# Patient Record
Sex: FEMALE | URBAN - METROPOLITAN AREA
[De-identification: names, ages, dates, MRNs, and addresses within clinical notes are randomized per-mention and may not be internally consistent; named-entity substitution may affect disease eponyms.]

---

## 2021-11-11 ENCOUNTER — OFFICE VISIT (OUTPATIENT)
Dept: URBAN - METROPOLITAN AREA CLINIC 35 | Facility: CLINIC | Age: 46
End: 2021-11-11

## 2021-11-11 NOTE — HPI-MIGRATED HPI
;     Gastroesophageal Reflux :                                    46 year old female patient presents for heartburn consult. Patient states he has been experiencing symptoms since ___. Patient admits /denies currently taking any OTC or prescribed medications for relief of symptoms. Patient describes symptoms as__and states they are most present during or after ___. Patient admits /denies nighttime symptoms. Patient admits /denies nausea or vomiting. Patient admits /denies any associated weight loss. Patient admits/denies EGD in the past.;

## 2022-03-15 ENCOUNTER — LAB OUTSIDE AN ENCOUNTER (OUTPATIENT)
Dept: URBAN - METROPOLITAN AREA CLINIC 35 | Facility: CLINIC | Age: 47
End: 2022-03-15

## 2022-03-15 ENCOUNTER — OFFICE VISIT (OUTPATIENT)
Dept: URBAN - METROPOLITAN AREA CLINIC 35 | Facility: CLINIC | Age: 47
End: 2022-03-15
Payer: COMMERCIAL

## 2022-03-15 VITALS
HEART RATE: 83 BPM | OXYGEN SATURATION: 99 % | BODY MASS INDEX: 28.99 KG/M2 | HEIGHT: 65 IN | SYSTOLIC BLOOD PRESSURE: 142 MMHG | DIASTOLIC BLOOD PRESSURE: 108 MMHG | WEIGHT: 174 LBS

## 2022-03-15 DIAGNOSIS — R14.0 BLOATING SYMPTOM: ICD-10-CM

## 2022-03-15 DIAGNOSIS — K21.9 GERD WITHOUT ESOPHAGITIS: ICD-10-CM

## 2022-03-15 PROCEDURE — 99204 OFFICE O/P NEW MOD 45 MIN: CPT | Performed by: INTERNAL MEDICINE

## 2022-03-15 RX ORDER — CHOLECALCIFEROL (VITAMIN D3) 50 MCG
1 TABLET TABLET ORAL ONCE A DAY
Status: ACTIVE | COMMUNITY

## 2022-03-15 RX ORDER — SODIUM PICOSULFATE, MAGNESIUM OXIDE, AND ANHYDROUS CITRIC ACID 10; 3.5; 12 MG/160ML; G/160ML; G/160ML
160 ML LIQUID ORAL
Qty: 320 MILLILITER | Refills: 0 | OUTPATIENT
Start: 2022-05-25 | End: 2022-05-27

## 2022-03-15 RX ORDER — OMEPRAZOLE 40 MG/1
1 CAPSULE 30 MINUTES BEFORE MORNING MEAL CAPSULE, DELAYED RELEASE ORAL ONCE A DAY
Status: ACTIVE | COMMUNITY

## 2022-03-15 NOTE — HPI-MIGRATED HPI
;     Gastroesophageal Reflux :                                     47 year old female patient presents for heartburn and SOB  consult. Patient states she has been experiencing symptoms for approximately 1 year. Patient admits  currently taking Omeprazole 40mg prescribed by PCP for relief of symptoms.  She does admit since taking Omeprazole her SOB has improved. Patient describes symptoms as severe SOB , Dysphagia and constanty cearing her throat . Pt states they are most present during the evening or after meals .  Patient admits  nighttime symptoms waking her during her sleep which she states she has woke up gasping for Air Several times. Patient denies nausea or vomiting. Patient denies any associated weight loss. Patient denies EGD in the past.;   Of note patient has visited [facility] ER  [place] approximately 2 weeks ago due to chest pains and SOB . She was diagnosed with anxiety . She had fu Cardiac workup including EKG, Echo ,MRI and several other test which all resulted normal .

## 2022-05-24 ENCOUNTER — TELEPHONE ENCOUNTER (OUTPATIENT)
Dept: URBAN - METROPOLITAN AREA CLINIC 23 | Facility: CLINIC | Age: 47
End: 2022-05-24

## 2022-05-25 ENCOUNTER — LAB OUTSIDE AN ENCOUNTER (OUTPATIENT)
Dept: URBAN - METROPOLITAN AREA CLINIC 35 | Facility: CLINIC | Age: 47
End: 2022-05-25

## 2022-05-26 ENCOUNTER — OFFICE VISIT (OUTPATIENT)
Dept: URBAN - METROPOLITAN AREA SURGERY CENTER 8 | Facility: SURGERY CENTER | Age: 47
End: 2022-05-26
Payer: COMMERCIAL

## 2022-05-26 DIAGNOSIS — Z12.11 COLON CANCER SCREENING: ICD-10-CM

## 2022-05-26 DIAGNOSIS — K31.89 ACQUIRED DEFORMITY OF DUODENUM: ICD-10-CM

## 2022-05-26 DIAGNOSIS — R14.0 ABDOMINAL BLOATING: ICD-10-CM

## 2022-05-26 DIAGNOSIS — R12 BURNING REFLUX: ICD-10-CM

## 2022-05-26 DIAGNOSIS — K21.9 ACID REFLUX: ICD-10-CM

## 2022-05-26 DIAGNOSIS — K63.5 BENIGN COLON POLYP: ICD-10-CM

## 2022-05-26 PROCEDURE — G8907 PT DOC NO EVENTS ON DISCHARG: HCPCS | Performed by: INTERNAL MEDICINE

## 2022-05-26 PROCEDURE — 43239 EGD BIOPSY SINGLE/MULTIPLE: CPT | Performed by: INTERNAL MEDICINE

## 2022-05-26 PROCEDURE — 45380 COLONOSCOPY AND BIOPSY: CPT | Performed by: INTERNAL MEDICINE

## 2022-05-26 RX ORDER — OMEPRAZOLE 40 MG/1
1 CAPSULE 30 MINUTES BEFORE MORNING MEAL CAPSULE, DELAYED RELEASE ORAL ONCE A DAY
Status: ACTIVE | COMMUNITY

## 2022-05-26 RX ORDER — CHOLECALCIFEROL (VITAMIN D3) 50 MCG
1 TABLET TABLET ORAL ONCE A DAY
Status: ACTIVE | COMMUNITY

## 2022-05-26 RX ORDER — SODIUM PICOSULFATE, MAGNESIUM OXIDE, AND ANHYDROUS CITRIC ACID 10; 3.5; 12 MG/160ML; G/160ML; G/160ML
160 ML LIQUID ORAL
Qty: 320 MILLILITER | Refills: 0 | Status: ACTIVE | COMMUNITY
Start: 2022-05-25 | End: 2022-05-27

## 2022-06-14 ENCOUNTER — CLAIMS CREATED FROM THE CLAIM WINDOW (OUTPATIENT)
Dept: URBAN - METROPOLITAN AREA CLINIC 35 | Facility: CLINIC | Age: 47
End: 2022-06-14
Payer: COMMERCIAL

## 2022-06-14 VITALS — WEIGHT: 173 LBS | BODY MASS INDEX: 28.82 KG/M2 | HEART RATE: 99 BPM | OXYGEN SATURATION: 99 % | HEIGHT: 65 IN

## 2022-06-14 DIAGNOSIS — K21.9 GERD WITHOUT ESOPHAGITIS: ICD-10-CM

## 2022-06-14 DIAGNOSIS — K58.1 IRRITABLE BOWEL SYNDROME WITH CONSTIPATION: ICD-10-CM

## 2022-06-14 DIAGNOSIS — Z12.11 ENCOUNTER FOR SCREENING FOR MALIGNANT NEOPLASM OF COLON: ICD-10-CM

## 2022-06-14 DIAGNOSIS — R14.0 BLOATING SYMPTOM: ICD-10-CM

## 2022-06-14 PROBLEM — 305058001: Status: ACTIVE | Noted: 2022-05-25

## 2022-06-14 PROBLEM — 440630006: Status: ACTIVE | Noted: 2022-06-14

## 2022-06-14 PROBLEM — 266435005: Status: ACTIVE | Noted: 2022-03-15

## 2022-06-14 PROBLEM — 248490000: Status: ACTIVE | Noted: 2022-03-15

## 2022-06-14 PROCEDURE — 99214 OFFICE O/P EST MOD 30 MIN: CPT | Performed by: INTERNAL MEDICINE

## 2022-06-14 RX ORDER — FAMOTIDINE 20 MG/1
1 TABLET AT BEDTIME AS NEEDED TABLET, FILM COATED ORAL ONCE A DAY
Status: ACTIVE | COMMUNITY

## 2022-06-14 RX ORDER — CHOLECALCIFEROL (VITAMIN D3) 50 MCG
1 TABLET TABLET ORAL ONCE A DAY
Status: ACTIVE | COMMUNITY

## 2022-06-14 RX ORDER — LINACLOTIDE 145 UG/1
1 CAPSULE AT LEAST 30 MINUTES BEFORE THE FIRST MEAL OF THE DAY ON AN EMPTY STOMACH CAPSULE, GELATIN COATED ORAL ONCE A DAY
Qty: 30 | Refills: 2 | OUTPATIENT
Start: 2022-06-14 | End: 2022-09-12

## 2022-06-14 RX ORDER — OMEPRAZOLE 40 MG/1
1 CAPSULE 30 MINUTES BEFORE MORNING MEAL CAPSULE, DELAYED RELEASE ORAL ONCE A DAY
Status: ON HOLD | COMMUNITY

## 2022-06-14 NOTE — HPI-TODAY'S VISIT:
Patient presents for follow up of GERD and EGD . Pt had procedure since last visit with results noted . She denies complications after procedure  .  She admits taking Famotidine 20mg daily with good relief . denies nausea or vomiting .

## 2022-06-14 NOTE — HPI-MIGRATED HPI
;     Gastroesophageal Reflux :                                     47 year old female patient presents for heartburn and SOB  consult. Patient states she has been experiencing symptoms for approximately 1 year. Patient admits  currently taking Omeprazole 40mg prescribed by PCP for relief of symptoms.  She does admit since taking Omeprazole her SOB has improved. Patient describes symptoms as severe SOB , Dysphagia and constanty cearing her throat . Pt states they are most present during the evening or after meals .  Patient admits  nighttime symptoms waking her during her sleep which she states she has woke up gasping for Air Several times. Patient denies nausea or vomiting. Patient denies any associated weight loss. Patient denies EGD in the past.;   Of note patient has visited [facility] ER  [place] approximately 2 weeks ago due to chest pains and SOB . She was diagnosed with anxiety . She had fu Cardiac workup including EKG, Echo ,MRI and several other test which all resulted normal .

## 2022-06-14 NOTE — HPI-COLONOSCOPY FOLLOWUP
Patient presents today for colonoscopy follow up. Patient had colonoscopy completed on 05/26/2022, with results noted below. Patient denies any complications after procedure.  Patient currently constipation .  She admits having a bowel movement every few days . She admits associated bloating when she is unable to pass stool causing abdominal discomfort and SOB . Patient denies rectal bleeding, melena, or mucus.

## 2022-07-14 ENCOUNTER — TELEPHONE ENCOUNTER (OUTPATIENT)
Dept: URBAN - METROPOLITAN AREA CLINIC 35 | Facility: CLINIC | Age: 47
End: 2022-07-14

## 2022-07-15 ENCOUNTER — OFFICE VISIT (OUTPATIENT)
Dept: URBAN - METROPOLITAN AREA CLINIC 35 | Facility: CLINIC | Age: 47
End: 2022-07-15

## 2022-07-15 NOTE — HPI-CONSTIPATION
Patient admits (recent onset/longstanding history) or (diarrhea/constipation/change in bowel habits/varying bowel habits). Onset was __. Patient currently admits __ bowel movements per __. Stools are ___. Patient ___ blood, mucus, or melena in stools. Patient states that their previous bowel habits were __ movements per __, with __ and __ stools.  Start Linzess Capsule, 145 MCG, 1 capsule at least 30 minutes before the first meal of the day on an empty stomach, Orally, Once a day, 30 day(s), 30,

## 2022-07-15 NOTE — HPI-GASTROESOPHAGEAL REFLUX DISEASE
Patient presents today for a follow up of GERD. She admits/denies any break though episodes of acid reflux since her last visit.   She admits the continued use of Omeprazole 40 mg 1 PO QD does/not control any symptoms that she may have.    Last visit 06/14/22 47 year old female patient presents for heartburn and SOB  consult. Patient states she has been experiencing symptoms for approximately 1 year. Patient admits  currently taking Omeprazole 40mg prescribed by PCP for relief of symptoms.  She does admit since taking Omeprazole her SOB has improved. Patient describes symptoms as severe SOB , Dysphagia and constanty cearing her throat . Pt states they are most present during the evening or after meals .  Patient admits  nighttime symptoms waking her during her sleep which she states she has woke up gasping for Air Several times. Patient denies nausea or vomiting. Patient denies any associated weight loss. Patient denies EGD in the past.;   Of note patient has visited [facility]  [place] approximately 2 weeks ago due to chest pains and SOB . She was diagnosed with anxiety . She had fu Cardiac workup including EKG, Echo ,MRI and several other test which all resulted normal .

## 2022-12-28 ENCOUNTER — TELEPHONE ENCOUNTER (OUTPATIENT)
Dept: URBAN - METROPOLITAN AREA CLINIC 36 | Facility: CLINIC | Age: 47
End: 2022-12-28

## 2023-01-06 ENCOUNTER — OFFICE VISIT (OUTPATIENT)
Dept: URBAN - METROPOLITAN AREA CLINIC 35 | Facility: CLINIC | Age: 48
End: 2023-01-06

## 2023-01-06 NOTE — HPI-GASTROESOPHAGEAL REFLUX DISEASE
Patient presents today for a follow up of GERD. She admits/denies any break though episodes of acid reflux since her last visit.   She admits the continued use of Omeprazole 40 mg 1 PO QD does/not control any symptoms that she may have.    Last visit 06/14/22 47 year old female patient presents for heartburn and SOB  consult. Patient states she has been experiencing symptoms for approximately 1 year. Patient admits  currently taking Omeprazole 40mg prescribed by PCP for relief of symptoms.  She does admit since taking Omeprazole her SOB has improved. Patient describes symptoms as severe SOB , Dysphagia and constanty cearing her throat . Pt states they are most present during the evening or after meals .  Patient admits  nighttime symptoms waking her during her sleep which she states she has woke up gasping for Air Several times. Patient denies nausea or vomiting. Patient denies any associated weight loss. Patient denies EGD in the past.;   Of note patient has visited [facility] ER  [place] approximately 2 weeks ago due to chest pains and SOB . She was diagnosed with anxiety . She had fu Cardiac workup including EKG, Echo ,MRI and several other test which all resulted normal .

## 2023-03-23 ENCOUNTER — OFFICE VISIT (OUTPATIENT)
Dept: URBAN - METROPOLITAN AREA CLINIC 35 | Facility: CLINIC | Age: 48
End: 2023-03-23
Payer: COMMERCIAL

## 2023-03-23 VITALS
OXYGEN SATURATION: 98 % | WEIGHT: 183 LBS | HEART RATE: 95 BPM | BODY MASS INDEX: 30.49 KG/M2 | HEIGHT: 65 IN | SYSTOLIC BLOOD PRESSURE: 156 MMHG | DIASTOLIC BLOOD PRESSURE: 110 MMHG

## 2023-03-23 DIAGNOSIS — K63.89 SMALL INTESTINAL BACTERIAL OVERGROWTH (SIBO): ICD-10-CM

## 2023-03-23 PROBLEM — 446081009: Status: ACTIVE | Noted: 2023-03-23

## 2023-03-23 PROCEDURE — 99213 OFFICE O/P EST LOW 20 MIN: CPT | Performed by: INTERNAL MEDICINE

## 2023-03-23 RX ORDER — FAMOTIDINE 20 MG/1
1 TABLET AT BEDTIME AS NEEDED TABLET, FILM COATED ORAL ONCE A DAY
Status: ACTIVE | COMMUNITY

## 2023-03-23 RX ORDER — OMEPRAZOLE 40 MG/1
1 CAPSULE 30 MINUTES BEFORE MORNING MEAL CAPSULE, DELAYED RELEASE ORAL ONCE A DAY
Status: ON HOLD | COMMUNITY

## 2023-03-23 RX ORDER — RIFAXIMIN 550 MG/1
1 TABLET TABLET ORAL
Qty: 42 | Refills: 0 | OUTPATIENT
Start: 2023-03-23 | End: 2023-04-06

## 2023-03-23 RX ORDER — CHOLECALCIFEROL (VITAMIN D3) 50 MCG
1 TABLET TABLET ORAL ONCE A DAY
Status: ACTIVE | COMMUNITY

## 2023-03-23 NOTE — HPI-GASTROESOPHAGEAL REFLUX DISEASE
Patient presents today for a follow up of GERD.   She admits tiffany bennett completed 09/2022 with POSITIVE results .  She admits rare  break though episodes of acid reflux since her last visit.  She states she has been able to tolerate more acidic foods . She denies nausea or vomiting .   She denies the continued use of Omeprazole 40 mg 1 PO QD but has continued Famotidine 20mg as needed. She states Omeprazole caused several side effects.  Last visit 06/14/22 47 year old female patient presents for heartburn and SOB  consult. Patient states she has been experiencing symptoms for approximately 1 year. Patient admits  currently taking Omeprazole 40mg prescribed by PCP for relief of symptoms.  She does admit since taking Omeprazole her SOB has improved. Patient describes symptoms as severe SOB , Dysphagia and constanty cearing her throat . Pt states they are most present during the evening or after meals .  Patient admits  nighttime symptoms waking her during her sleep which she states she has woke up gasping for Air Several times. Patient denies nausea or vomiting. Patient denies any associated weight loss. Patient denies EGD in the past.;   Of note patient has visited [facility] ER  [place] approximately 2 weeks ago due to chest pains and SOB . She was diagnosed with anxiety . She had fu Cardiac workup including EKG, Echo ,MRI and several other test which all resulted normal .

## 2024-02-16 ENCOUNTER — OV EP (OUTPATIENT)
Dept: URBAN - METROPOLITAN AREA CLINIC 35 | Facility: CLINIC | Age: 49
End: 2024-02-16

## 2024-02-16 NOTE — HPI-GASTROESOPHAGEAL REFLUX DISEASE
Patient presents follow up of of GERD. Patient admits/denies improvements of episodes of acid reflux, bloating. Patient admits/denies taking Xifaxan Tablet, 550 MG. He states that the Xifaxan  has not helped or improved symptoms. Patient admits/denies Abdominal pain, nausea, vominting.    Of las visit( 03/23/2024) Patient presents today for a follow up of GERD.   She admits tiffany CEDILLO sabrina completed 09/2022 with POSITIVE results .  She admits rare  break though episodes of acid reflux since her last visit.  She states she has been able to tolerate more acidic foods . She denies nausea or vomiting.  She denies the continued use of Omeprazole 40 mg 1 PO QD but has continued Famotidine 20mg as needed. She states Omeprazole caused several side effects.  Last visit 06/14/22 47 year old female patient presents for heartburn and SOB  consult. Patient states she has been experiencing symptoms for approximately 1 year. Patient admits  currently taking Omeprazole 40mg prescribed by PCP for relief of symptoms.  She does admit since taking Omeprazole her SOB has improved. Patient describes symptoms as severe SOB , Dysphagia and constanty cearing her throat . Pt states they are most present during the evening or after meals .  Patient admits  nighttime symptoms waking her during her sleep which she states she has woke up gasping for Air Several times. Patient denies nausea or vomiting. Patient denies any associated weight loss. Patient denies EGD in the past.;   Of note patient has visited [facility]  [place] approximately 2 weeks ago due to chest pains and SOB . She was diagnosed with anxiety . She had fu Cardiac workup including EKG, Echo ,MRI and several other test which all resulted normal .